# Patient Record
(demographics unavailable — no encounter records)

---

## 2025-01-02 NOTE — HISTORY OF PRESENT ILLNESS
[Other Location: e.g. School (Enter Location, City,State)___] : at [unfilled], at the time of the visit. [Medical Office: ([facility])___] : at the medical office located in  [Verbal consent obtained from patient] : the patient, [unfilled] [FreeTextEntry8] : Pt comes in for eval of bump on her lip. About 5 days ago she noticed a bump on her lip after eating Chinese food, a few days before that had bodyaches and a runny nose. Not tested for COVID or flu at that time. 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 The bump started at border between mucosa and lip, then spread more onto front of lip. Area is tender. She thinks there are blisters there. Hasn't noticed enlarged LNs. No fever or chills. Hasn't tried OTC meds except for zyrtec in case this was allergic reaction.

## 2025-01-02 NOTE — ASSESSMENT
[FreeTextEntry1] : # Cold sore Sent in valtrex and recommended abreva Tylenol/motrin PRN If no improvement or if worsening should be seen in person  f/u PRN

## 2025-01-02 NOTE — PHYSICAL EXAM
[Normal] : no acute distress, well nourished, well developed and well-appearing [Normal Outer Ear/Nose] : the outer ears and nose were normal in appearance [No Respiratory Distress] : no respiratory distress  [Normal Affect] : the affect was normal [Alert and Oriented x3] : oriented to person, place, and time [Normal Insight/Judgement] : insight and judgment were intact [de-identified] : No vitals due to virtual visit [de-identified] : swelling and erythema of lower lip right of midline. Could not visuazlie blisters due to low video resolution

## 2025-03-20 NOTE — HISTORY OF PRESENT ILLNESS
[FreeTextEntry1] : forms for work [de-identified] : 37 yo here for work forms.  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

## 2025-04-05 NOTE — HISTORY OF PRESENT ILLNESS
[FreeTextEntry1] : MMR booster  [de-identified] : 35 yo here for MMR booster titers revealed nonimmune to measles - needs booster

## 2025-04-05 NOTE — HISTORY OF PRESENT ILLNESS
[FreeTextEntry1] : MMR booster  [de-identified] : 35 yo here for MMR booster titers revealed nonimmune to measles - needs booster

## 2025-05-07 NOTE — HISTORY OF PRESENT ILLNESS
[FreeTextEntry8] : Here after fall.  Saturday night family members party.  Was standing on plastic storage bin; was wearing heels;   Hit back and head-had big wig on-which helped break fall patient thinks/ Had 2 drinks but had eaten dinner.  Lost balance and fell backwards. Got up right away.  Did not pass out.   [place] fine the next day.  Sunday. Sunday night-neck and shoulder pain at end of day.  Neck and shoulder pain.   No vomitting no nausea.   Pressure/slight headache today.    Stayed home on Monday. Pain in neck and back yesterday-tuesday; little bit better tuesday.    Today left ear and gum hurting.

## 2025-05-07 NOTE — PHYSICAL EXAM
[No Acute Distress] : no acute distress [Normal Sclera/Conjunctiva] : normal sclera/conjunctiva [PERRL] : pupils equal round and reactive to light [EOMI] : extraocular movements intact [Normal Oropharynx] : the oropharynx was normal [No Lymphadenopathy] : no lymphadenopathy [No Edema] : there was no peripheral edema [No Extremity Clubbing/Cyanosis] : no extremity clubbing/cyanosis [Normal] : affect was normal and insight and judgment were intact [de-identified] : TMJ click palpable left side [de-identified] : thoracic paraspinal tenderness to palpation; no cervical tenderness; no bruising [de-identified] : CN II-XII intact; Cervical ROM intact

## 2025-05-07 NOTE — PLAN
[FreeTextEntry1] : s/p Fall   Atypical Chest Pain- EKG performed.  Sinus rhythm non-acute. Stable exam in office. Check labs.  Back and Neck Pain-can take acetaminophen for pain relief. Advised to try trial of heat/warm compresses for muscle tenderness as needed.  Advised to cover compress, not place directly on skin and not to apply for more than 15 minutes at a time.  Patient expressed understanding. Ortho Sports Med eval-  Palpable TMJ Click-advised soft diet.  Has dental appointment upcoming. ER precautions discussed. Advised if any vision changes-worst headache, n/v needs eval.

## 2025-05-07 NOTE — REVIEW OF SYSTEMS
[Earache] : earache [Headache] : headache [Negative] : Gastrointestinal [Fever] : no fever [Chills] : no chills [Vision Problems] : no vision problems [Nasal Discharge] : no nasal discharge [Sore Throat] : no sore throat [Chest Pain] : no chest pain [Palpitations] : no palpitations [Shortness Of Breath] : no shortness of breath [Wheezing] : no wheezing [Dysuria] : no dysuria [Dizziness] : no dizziness [FreeTextEntry2] : Took Aspirin-wasn't having pain [FreeTextEntry4] : clogged left ear-no known sick contacts at home; just started today [FreeTextEntry5] : chest pain-anxious episodes since falling [FreeTextEntry8] : LMP-05/2/25 [FreeTextEntry9] : neck and back pain 4-5/10-worse with certain movements. Some headache today